# Patient Record
Sex: FEMALE | URBAN - METROPOLITAN AREA
[De-identification: names, ages, dates, MRNs, and addresses within clinical notes are randomized per-mention and may not be internally consistent; named-entity substitution may affect disease eponyms.]

---
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
## 2017-06-06 NOTE — PATIENT INSTRUCTIONS
Labs today.    Schedule mammogram.    Tonja Bravo, ROGER-C  [phone]    Preventive Health Recommendations  Female Ages 40 to 49    Yearly exam:     See your health care provider every year in order to  1. Review health changes.   2. Discuss preventive care.    3. Review your medicines if your doctor prescribed any.      Get a Pap test every three years (unless you have an abnormal result and your provider advises testing more often).      If you get Pap tests with HPV test, you only need to test every 5 years, unless you have an abnormal result. You do not need a Pap test if your uterus was removed (hysterectomy) and you have not had cancer.      You should be tested each year for STDs (sexually transmitted diseases), if you're at risk.       Ask your doctor if you should have a mammogram.      Have a colonoscopy (test for colon cancer) if someone in your family has had colon cancer or polyps before age 50.       Have a cholesterol test every 5 years.       Have a diabetes test (fasting glucose) after age 45. If you are at risk for diabetes, you should have this test every 3 years.    Shots: Get a flu shot each year. Get a tetanus shot every 10 years.     Nutrition:     Eat at least 5 servings of fruits and vegetables each day.    Eat whole-grain bread, whole-wheat pasta and brown rice instead of white grains and rice.    Talk to your provider about Calcium and Vitamin D.     Lifestyle    Exercise at least 150 minutes a week (an average of 30 minutes a day, 5 days a week). This will help you control your weight and prevent disease.    Limit alcohol to one drink per day.    No smoking.     Wear sunscreen to prevent skin cancer.    See your dentist every six months for an exam and cleaning.

## 2017-06-20 ENCOUNTER — OFFICE VISIT (OUTPATIENT)
Dept: FAMILY MEDICINE | Facility: OTHER | Age: 47
End: 2017-06-20
Payer: COMMERCIAL

## 2017-06-20 VITALS
BODY MASS INDEX: 28.95 KG/M2 | WEIGHT: 163.4 LBS | HEART RATE: 63 BPM | RESPIRATION RATE: 16 BRPM | SYSTOLIC BLOOD PRESSURE: 114 MMHG | DIASTOLIC BLOOD PRESSURE: 80 MMHG | HEIGHT: 63 IN | OXYGEN SATURATION: 98 % | TEMPERATURE: 98 F

## 2017-06-20 DIAGNOSIS — E78.2 MIXED HYPERLIPIDEMIA: ICD-10-CM

## 2017-06-20 DIAGNOSIS — R19.7 DIARRHEA, UNSPECIFIED TYPE: ICD-10-CM

## 2017-06-20 DIAGNOSIS — H54.7 WORSENING VISION: ICD-10-CM

## 2017-06-20 DIAGNOSIS — Z83.49 FAMILY HISTORY OF THYROID DISEASE: ICD-10-CM

## 2017-06-20 DIAGNOSIS — Z12.4 SCREENING FOR MALIGNANT NEOPLASM OF CERVIX: Primary | ICD-10-CM

## 2017-06-20 DIAGNOSIS — Z13.1 SCREENING FOR DIABETES MELLITUS: ICD-10-CM

## 2017-06-20 DIAGNOSIS — Z00.00 ENCOUNTER FOR ROUTINE ADULT HEALTH EXAMINATION WITHOUT ABNORMAL FINDINGS: ICD-10-CM

## 2017-06-20 LAB
ANION GAP SERPL CALCULATED.3IONS-SCNC: 9 MMOL/L (ref 3–14)
BUN SERPL-MCNC: 11 MG/DL (ref 7–30)
CALCIUM SERPL-MCNC: 9.1 MG/DL (ref 8.5–10.1)
CHLORIDE SERPL-SCNC: 108 MMOL/L (ref 94–109)
CHOLEST SERPL-MCNC: 283 MG/DL
CO2 SERPL-SCNC: 25 MMOL/L (ref 20–32)
CREAT SERPL-MCNC: 0.62 MG/DL (ref 0.52–1.04)
GFR SERPL CREATININE-BSD FRML MDRD: ABNORMAL ML/MIN/1.7M2
GLUCOSE SERPL-MCNC: 112 MG/DL (ref 70–99)
HDLC SERPL-MCNC: 43 MG/DL
LDLC SERPL CALC-MCNC: 183 MG/DL
NONHDLC SERPL-MCNC: 240 MG/DL
POTASSIUM SERPL-SCNC: 4.2 MMOL/L (ref 3.4–5.3)
SODIUM SERPL-SCNC: 142 MMOL/L (ref 133–144)
TRIGL SERPL-MCNC: 287 MG/DL
TSH SERPL DL<=0.005 MIU/L-ACNC: 0.98 MU/L (ref 0.4–4)

## 2017-06-20 PROCEDURE — 80061 LIPID PANEL: CPT | Performed by: STUDENT IN AN ORGANIZED HEALTH CARE EDUCATION/TRAINING PROGRAM

## 2017-06-20 PROCEDURE — 36415 COLL VENOUS BLD VENIPUNCTURE: CPT | Performed by: STUDENT IN AN ORGANIZED HEALTH CARE EDUCATION/TRAINING PROGRAM

## 2017-06-20 PROCEDURE — 87624 HPV HI-RISK TYP POOLED RSLT: CPT | Performed by: STUDENT IN AN ORGANIZED HEALTH CARE EDUCATION/TRAINING PROGRAM

## 2017-06-20 PROCEDURE — 84443 ASSAY THYROID STIM HORMONE: CPT | Performed by: STUDENT IN AN ORGANIZED HEALTH CARE EDUCATION/TRAINING PROGRAM

## 2017-06-20 PROCEDURE — 99396 PREV VISIT EST AGE 40-64: CPT | Performed by: STUDENT IN AN ORGANIZED HEALTH CARE EDUCATION/TRAINING PROGRAM

## 2017-06-20 PROCEDURE — G0145 SCR C/V CYTO,THINLAYER,RESCR: HCPCS | Performed by: STUDENT IN AN ORGANIZED HEALTH CARE EDUCATION/TRAINING PROGRAM

## 2017-06-20 PROCEDURE — 80048 BASIC METABOLIC PNL TOTAL CA: CPT | Performed by: STUDENT IN AN ORGANIZED HEALTH CARE EDUCATION/TRAINING PROGRAM

## 2017-06-20 ASSESSMENT — PAIN SCALES - GENERAL: PAINLEVEL: NO PAIN (0)

## 2017-06-20 NOTE — MR AVS SNAPSHOT
After Visit Summary   6/20/2017    Carmella Burch    MRN: [id number]           Patient Information     Date Of Birth          1970        Visit Information        Provider Department      6/20/2017 8:00 AM Tonja Bravo APRN [facility]        Today's Diagnoses     Screening for malignant neoplasm of cervix    -  1    Worsening vision        Mixed hyperlipidemia        Screening for diabetes mellitus        Diarrhea, unspecified type        Family history of thyroid disease          Care Instructions    Labs today.    Schedule mammogram.    Tonja Bravo NP-C  [phone]    Preventive Health Recommendations  Female Ages 40 to 49    Yearly exam:     See your health care provider every year in order to  1. Review health changes.   2. Discuss preventive care.    3. Review your medicines if your doctor prescribed any.      Get a Pap test every three years (unless you have an abnormal result and your provider advises testing more often).      If you get Pap tests with HPV test, you only need to test every 5 years, unless you have an abnormal result. You do not need a Pap test if your uterus was removed (hysterectomy) and you have not had cancer.      You should be tested each year for STDs (sexually transmitted diseases), if you're at risk.       Ask your doctor if you should have a mammogram.      Have a colonoscopy (test for colon cancer) if someone in your family has had colon cancer or polyps before age 50.       Have a cholesterol test every 5 years.       Have a diabetes test (fasting glucose) after age 45. If you are at risk for diabetes, you should have this test every 3 years.    Shots: Get a flu shot each year. Get a tetanus shot every 10 years.     Nutrition:     Eat at least 5 servings of fruits and vegetables each day.    Eat whole-grain bread, whole-wheat pasta and brown rice instead of white grains and rice.    Talk to your provider about Calcium  and Vitamin D.     Lifestyle    Exercise at least 150 minutes a week (an average of 30 minutes a day, 5 days a week). This will help you control your weight and prevent disease.    Limit alcohol to one drink per day.    No smoking.     Wear sunscreen to prevent skin cancer.    See your dentist every six months for an exam and cleaning.          Follow-ups after your visit        Additional Services     OPTOMETRY REFERRAL       Your provider has referred you to:  [place]: [facility] [phone]   [email or web address]    Please be aware that coverage of these services is subject to the terms and limitations of your health insurance plan.  Call member services at your health plan with any benefit or coverage questions.      Please bring the following to your appointment:  >>   Any x-rays, CTs or MRIs which have been performed.  Contact the facility where they were done to arrange for  prior to your scheduled appointment.  Any new CT, MRI or other procedures ordered by your specialist must be performed at a [place] facility or coordinated by your clinic's referral office.    >>   List of current medications   >>   This referral request   >>   Any documents/labs given to you for this referral                  Who to contact     If you have questions or need follow up information about today's clinic visit or your schedule please contact [facility] directly at [phone].  Normal or non-critical lab and imaging results will be communicated to you by MyChart, letter or phone within 4 business days after the clinic has received the results. If you do not hear from us within 7 days, please contact the clinic through MyChart or phone. If you have a critical or abnormal lab result, we will notify you by phone as soon as possible.  Submit refill requests through ev-social or call your pharmacy and they will forward the refill request to us. Please allow 3  "business days for your refill to be completed.          Additional Information About Your Visit        MyChart Information     triptap gives you secure access to your electronic health record. If you see a primary care provider, you can also send messages to your care team and make appointments. If you have questions, please call your primary care clinic.  If you do not have a primary care provider, please call [phone] and they will assist you.        Care EveryWhere ID     This is your Care EveryWhere ID. This could be used by other organizations to access your [facility] medical records  [id number]        Your Vitals Were     Pulse Temperature Respirations Height Last Period Pulse Oximetry    63 98  F (36.7  C) (Oral) 16 5' 3.39\" (1.61 m) 06/14/2017 (Exact Date) 98%    BMI (Body Mass Index)                   28.59 kg/m2            Blood Pressure from Last 3 Encounters:   06/20/17 114/80   01/13/14 133/68   02/20/13 100/72    Weight from Last 3 Encounters:   06/20/17 163 lb 6.4 oz (74.1 kg)   01/13/14 155 lb (70.3 kg)   02/20/13 152 lb 12.8 oz (69.3 kg)              We Performed the Following     Basic metabolic panel     HPV High Risk Types DNA Cervical     Lipid panel reflex to direct LDL     OPTOMETRY REFERRAL     Pap imaged thin layer screen with HPV - recommended age 30 - 65 years (select HPV order below)     TSH with free T4 reflex        Primary Care Provider Office Phone # Fax #    Tonja GISSELL [facility] [phone]       [facility] [address]  [facility] [address]        Thank you!     Thank you for choosing [facility]  for your care. Our goal is always to provide you with excellent care. Hearing back from our patients is one way we can continue to improve our services. Please take a few minutes to complete the written survey that you may receive in the mail after your visit with us. Thank you!             Your Updated Medication " List - Protect others around you: Learn how to safely use, store and throw away your medicines at www.disposemymeds.org.      Notice  As of 6/20/2017  8:46 AM    You have not been prescribed any medications.

## 2017-06-20 NOTE — NURSING NOTE
"Chief Complaint   Patient presents with     Physical     Panel Management     Pap, Eye exam       Initial /80 (BP Location: Left arm, Patient Position: Chair, Cuff Size: Adult Regular)  Pulse 63  Temp 98  F (36.7  C) (Oral)  Resp 16  Ht 5' 3.39\" (1.61 m)  Wt 163 lb 6.4 oz (74.1 kg)  LMP 06/14/2017 (Exact Date)  SpO2 98%  BMI 28.59 kg/m2 Estimated body mass index is 28.59 kg/(m^2) as calculated from the following:    Height as of this encounter: 5' 3.39\" (1.61 m).    Weight as of this encounter: 163 lb 6.4 oz (74.1 kg).  Medication Reconciliation: complete   Lynda Narvaez CMA      "

## 2017-06-21 RX ORDER — SIMVASTATIN 10 MG
10 TABLET ORAL AT BEDTIME
Qty: 90 TABLET | Refills: 1 | Status: SHIPPED | OUTPATIENT
Start: 2017-06-21 | End: 2018-01-24

## 2017-06-22 LAB
COPATH REPORT: NORMAL
PAP: NORMAL

## 2017-06-23 LAB
FINAL DIAGNOSIS: NORMAL
HPV HR 12 DNA CVX QL NAA+PROBE: NEGATIVE
HPV16 DNA SPEC QL NAA+PROBE: NEGATIVE
HPV18 DNA SPEC QL NAA+PROBE: NEGATIVE
SPECIMEN DESCRIPTION: NORMAL

## 2017-07-11 ENCOUNTER — OFFICE VISIT (OUTPATIENT)
Dept: OPTOMETRY | Facility: CLINIC | Age: 47
End: 2017-07-11
Payer: COMMERCIAL

## 2017-07-11 DIAGNOSIS — H52.223 REGULAR ASTIGMATISM OF BOTH EYES: ICD-10-CM

## 2017-07-11 DIAGNOSIS — Z86.69: ICD-10-CM

## 2017-07-11 DIAGNOSIS — H52.4 PRESBYOPIA: Primary | ICD-10-CM

## 2017-07-11 PROCEDURE — 92015 DETERMINE REFRACTIVE STATE: CPT | Performed by: OPTOMETRIST

## 2017-07-11 PROCEDURE — 92004 COMPRE OPH EXAM NEW PT 1/>: CPT | Performed by: OPTOMETRIST

## 2017-07-11 ASSESSMENT — KERATOMETRY
OS_K2POWER_DIOPTERS: 46.00
OS_K1POWER_DIOPTERS: 45.00
OS_AXISANGLE2_DEGREES: 167
OD_K2POWER_DIOPTERS: 46.25
OD_K1POWER_DIOPTERS: 45.00
OD_AXISANGLE2_DEGREES: 3

## 2017-07-11 ASSESSMENT — CUP TO DISC RATIO
OD_RATIO: 0.4
OS_RATIO: 0.3

## 2017-07-11 ASSESSMENT — REFRACTION_MANIFEST
OD_SPHERE: 0.00
OD_ADD: +2.00
OS_AXIS: 68
OS_SPHERE: -0.25
METHOD_AUTOREFRACTION: 1
OS_SPHERE: 0.00
OD_CYLINDER: +0.50
OS_AXIS: 085
OD_AXIS: 106
OS_CYLINDER: +0.50
OD_CYLINDER: +0.50
OD_AXIS: 010
OS_CYLINDER: +0.50
OD_SPHERE: PLANO
OS_ADD: +2.00

## 2017-07-11 ASSESSMENT — REFRACTION_WEARINGRX
OD_SPHERE: +0.25
OD_ADD: +1.25
OS_SPHERE: +2.00
OS_SPHERE: +0.25
OS_CYLINDER: SPHERE
OS_ADD: +1.25
OD_CYLINDER: SPHERE
OD_SPHERE: +2.00
SPECS_TYPE: OTC

## 2017-07-11 ASSESSMENT — EXTERNAL EXAM - LEFT EYE: OS_EXAM: NORMAL

## 2017-07-11 ASSESSMENT — VISUAL ACUITY
CORRECTION_TYPE: GLASSES
OS_CC: 20/20
METHOD: SNELLEN - LINEAR
OS_SC: 20/20
OD_CC: 20/20-1
OD_SC: 20/20

## 2017-07-11 ASSESSMENT — CONF VISUAL FIELD
OS_NORMAL: 1
OD_NORMAL: 1

## 2017-07-11 ASSESSMENT — SLIT LAMP EXAM - LIDS
COMMENTS: NORMAL
COMMENTS: NORMAL

## 2017-07-11 ASSESSMENT — TONOMETRY
OD_IOP_MMHG: 16
IOP_METHOD: APPLANATION
OS_IOP_MMHG: 16

## 2017-07-11 ASSESSMENT — EXTERNAL EXAM - RIGHT EYE: OD_EXAM: NORMAL

## 2017-07-11 NOTE — PROGRESS NOTES
Chief Complaint   Patient presents with     COMPREHENSIVE EYE EXAM     Here in 2013         Last Eye Exam: 3/6/2013  Dilated Previously: Yes    What are you currently using to see?  Readers, uses them for reading, computer and [place]        Distance Vision Acuity: Satisfied with vision, no changes.     Near Vision Acuity: Some changes to near vision, has to use the readers more often    Eye Comfort: good, had really red right eye 3 weeks ago, no pain or irritation, denies  bruising easily    Do you use eye drops? 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       OBJECTIVE: See Ophthalmology exam    ASSESSMENT:    ICD-10-CM    1. Presbyopia H52.4 EYE EXAM (SIMPLE-NONBILLABLE)     REFRACTIVE STATUS   2. Hx of subconjunctival hemorrhage right Z86.69 EYE EXAM (SIMPLE-NONBILLABLE)     REFRACTIVE STATUS   3. Regular astigmatism of both eyes H52.223 EYE EXAM (SIMPLE-NONBILLABLE)     REFRACTIVE STATUS      PLAN:     Patient Instructions   Patient was advised of today's exam findings.  Reading glasses advised  If conjunctival hemorrhages reoccur more than 4 x a year see your primary care provider  Use Blink Tears three times a day as needed   Return in 1 year for eye exam    Belén Acosta O.D.  [facility]   [address] [place], [place] [address] [phone]

## 2017-07-11 NOTE — PATIENT INSTRUCTIONS
Patient was advised of today's exam findings.  Reading glasses advised  If conjunctival hemorrhages reoccur more than 4 x a year see your primary care provider  Use Blink Tears three times a day as needed   Return in 1 year for eye exam    Belén Acosta O.D.  [facility]   [address] [place] [address] [phone]

## 2017-07-11 NOTE — MR AVS SNAPSHOT
After Visit Summary   7/11/2017    Carmella Burch    MRN: [id number]           Patient Information     Date Of Birth          1970        Visit Information        Provider Department      7/11/2017 11:00 AM Belén Acosta OD [facility]        Today's Diagnoses     Presbyopia    -  1      Care Instructions    Patient was advised of today's exam findings.  Reading glasses advised  If conjunctival hemorrhages reoccur more than 4 x a year see your primary care provider  Use Blink Tears three times a day as needed   Return in 1 year for eye exam    Belén Acosta O.D.  [facility]   [address] [place] [address]  [phone]            Follow-ups after your visit        Who to contact     If you have questions or need follow up information about today's clinic visit or your schedule please contact [facility] directly at [phone].  Normal or non-critical lab and imaging results will be communicated to you by MyChart, letter or phone within 4 business days after the clinic has received the results. If you do not hear from us within 7 days, please contact the clinic through Jumiohart or phone. If you have a critical or abnormal lab result, we will notify you by phone as soon as possible.  Submit refill requests through [employer] or call your pharmacy and they will forward the refill request to us. Please allow 3 business days for your refill to be completed.          Additional Information About Your Visit        MyChart Information     [employer] gives you secure access to your electronic health record. If you see a primary care provider, you can also send messages to your care team and make appointments. If you have questions, please call your primary care clinic.  If you do not have a primary care provider, please call [phone] and they will assist you.        Care EveryWhere ID     This is your Care EveryWhere ID. This could be used by other  organizations to access your [facility] medical records  [id number]        Your Vitals Were     Last Period                   06/14/2017 (Exact Date)            Blood Pressure from Last 3 Encounters:   06/20/17 114/80   01/13/14 133/68   02/20/13 100/72    Weight from Last 3 Encounters:   06/20/17 74.1 kg (163 lb 6.4 oz)   01/13/14 70.3 kg (155 lb)   02/20/13 69.3 kg (152 lb 12.8 oz)              Today, you had the following     No orders found for display       Primary Care Provider Office Phone # Fax #    Tonja Kochean, APRN [facility] [phone]       [facility] [address]  [place] [address]        Equal Access to Services     TAPAN GEORGE : Hadii aad ku hadasho Soomaali, waaxda luqadaha, qaybta kaalmada adeegyada, viridiana king . So [facility] [phone].    ATENCIÓN: Si habla español, tiene a serrano disposición servicios gratuitos de asistencia lingüística. Llame al [phone].    We comply with applicable federal civil rights laws and [place] laws. We do not discriminate on the basis of race, color, national origin, age, disability sex, sexual orientation or gender identity.            Thank you!     Thank you for choosing [facility]  for your [facility]. Our goal is always to provide you with excellent care. Hearing back from our patients is one way we can continue to improve our services. Please take a few minutes to complete the written survey that you may receive in the mail after your visit with us. Thank you!             Your Updated Medication List - Protect others around you: Learn how to safely use, store and throw away your medicines at www.disposemymeds.org.          This list is accurate as of: 7/11/17 12:02 PM.  Always use your most recent med list.                   Brand Name Dispense Instructions for use Diagnosis    simvastatin 10 MG tablet    ZOCOR    90 tablet    Take 1 tablet (10 mg) by mouth At Bedtime    Mixed  hyperlipidemia

## 2017-12-22 ENCOUNTER — TELEPHONE (OUTPATIENT)
Dept: FAMILY MEDICINE | Facility: OTHER | Age: 47
End: 2017-12-22

## 2017-12-22 NOTE — TELEPHONE ENCOUNTER
Panel Management Review      Patient has the following on her problem list: None      Composite cancer screening  Chart review shows that this patient is due/due soon for the following None  Summary:    Patient is due/failing the following:   lipids    Action needed:   Patient needs fasting lab only appointment, order placed    Type of outreach:    Phone, left message for patient to call back.     Questions for provider review:    None                                                                                                                                    Roxy Mattson CMA ([facility])       Chart routed to Care Team .

## 2017-12-22 NOTE — LETTER
[facility]  [id number] [place] [id number]  Phone: [phone]  January 9, 2018      Carmella Burch  [address] [place] [address]      Dear Carmella,    We care about your health and have reviewed your health plan including your medical conditions, medications, and lab results.  Based on this review, it is recommended that you follow up regarding the following health topic(s):  -Cholesterol    We recommend you take the following action(s):  -schedule a LAB ONLY APPOINTMENT to recheck your: Lipids (fasting cholesterol - nothing to eat except water and/or meds for 8-10 hours) within the next 1-4 weeks.  If' you have had labs completed eslewhere, please let us know so we can update your records.       Please call us at the [facility] - [phone] (or use [facility]) to address the above recommendations.     Thank you for trusting [facility] and we appreciate the opportunity to serve you.  We look forward to supporting your healthcare needs in the future.    Healthy Regards,    Your Health Care Team  [facility]

## 2018-01-05 NOTE — TELEPHONE ENCOUNTER
Summary:     Patient is due/failing the following:   lipids     Action needed:   Patient needs fasting lab only appointment, order placed     Type of outreach:    Phone, left message for patient to call back.     Roxy Mattson CMA ([facility])

## 2018-01-23 ENCOUNTER — TELEPHONE (OUTPATIENT)
Dept: FAMILY MEDICINE | Facility: OTHER | Age: 48
End: 2018-01-23

## 2018-01-23 NOTE — TELEPHONE ENCOUNTER
Carmella Burch is a 47 year old female who calls with hypertension.    NURSING ASSESSMENT:  Description:  Pt would like to be seen due to high blood pressure.    Onset/duration:  today  Precip. factors:  none  Associated symptoms:  High blood pressure (150/99).  Denies chest pain, headache, blurred vision.  Improves/worsens symptoms:  Had headache earlier this morning but it is now gone.  Pain scale (0-10)   0/10    Allergies: No Known Allergies    RECOMMENDED DISPOSITION:  See in 24 hours - due to pt would like to be seen.  Will comply with recommendation: Yes  If further questions/concerns or if symptoms do not improve, worsen or new symptoms develop, call your PCP or [facility] as soon as possible.      Guideline used: hypertension  Telephone Triage Protocols for Nurses, Fifth Edition, Emma Salamanca RN

## 2018-01-23 NOTE — PROGRESS NOTES
SUBJECTIVE:                                                    Carmella Burch is a 47 year old female who presents to clinic today for the following health issues:    History of Present Illness     Hypertension:     Outpatient blood pressures:  Are not being checked    Dietary sodium intake::  Not monitoring salt intake    Diet:  Regular (no restrictions)  Frequency of exercise:  2-3 days/week  Duration of exercise:  15-30 minutes  Taking medications regularly:  Yes  Medication side effects:  None  Additional concerns today:  YES    Answers for HPI/ROS submitted by the patient on 1/24/2018   PHQ-2 Score: 0    Patient denies history of hypertension. She states she experienced a fairly intense headache in the center of her 1 week ago which improved after an hour. She then had another headache on Sunday with bright lights in her peripheral vision that lasted a few hours with similar symptoms yesterday. She felt faint several times during these episodes but never passed out and also felt nauseous. She denies experience any sound or light sensitivity. She woke up with a headache over her right forehead today but denies any current visual symptoms. She has taken Tylenol without benefit. She has never had headaches like this in the past. She will occasionally have headaches during the first day of her period but this does not happen often. She denies visual loss, facial droop, extremity weakness, or balance difficulty. 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 She denies any chest pain, lightheadedness or shortness of breath.     She has been having severe hot flashes daily for the past few months and has not had her period since November. He periods have been less frequent over the past year. She describes some vaginal dryness which has been improved with over the counter treatments.     NURSING  "ASSESSMENT:  Description:  Pt would like to be seen due to high blood pressure.    Onset/duration:  today  Precip. factors:  none  Associated symptoms:  High blood pressure (150/99).  Denies chest pain, headache, blurred vision.  Improves/worsens symptoms:  Had headache earlier this morning but it is now gone.  Pain scale (0-10)   0/10    Problem list and histories reviewed & adjusted, as indicated.  Additional history: none    ROS:  GENERAL: +Hot flashes. Denies fever, fatigue, weakness, weight gain, or weight loss.  CARDIOVASCULAR: Denies chest pain, shortness of breath, irregular heartbeats, palpitations, or edema.  RESPIRATORY: Denies cough, hemoptysis, and shortness of breath.  NEUROLOGIC: +Frontal headaches. Denies fainting, dizziness, memory loss, numbness, tingling, or seizures.     OBJECTIVE:     /86  Pulse 54  Temp 97.7  F (36.5  C) (Temporal)  Resp 16  Ht 5' 3\" (1.6 m)  Wt 156 lb 6.4 oz (70.9 kg)  SpO2 99%  BMI 27.71 kg/m2  Body mass index is 27.71 kg/(m^2).  GENERAL: healthy, alert and no distress  EYES: Eyes grossly normal to inspection, PERRL and conjunctivae and sclerae normal  RESP: lungs clear to auscultation - no rales, rhonchi or wheezes  CV: regular rate and rhythm, normal S1 S2, no S3 or S4, no murmur, click or rub, no peripheral edema  NEURO: Normal strength and tone, mentation intact and speech normal. Cranial nerves II-XII are grossly intact. DTRs are 2+/4 throughout and symmetric. Gait is stable.   PSYCH: mentation appears normal, affect normal/bright    ASSESSMENT/PLAN:       ICD-10-CM    1. New onset of headaches R51 MR Brain w/o Contrast   2. Elevated blood pressure reading without diagnosis of hypertension R03.0    3. Menopausal syndrome (hot flashes) N95.1 Follicle stimulating hormone     Lutropin   4. Elevated fasting glucose R73.01 Glucose       1. Recent headaches sound migrainous in origin but she denies history of any headaches like this in the past and rarely has any " type of headache. I recommend a brain MRI for further evaluation to rule out any worrisome causes. If headaches continue, I recommend resting in a dark room with a cool cloth over her forehead. She can continue with NSAIDs and Tylenol as needed and we can discuss migraine medications if headache persist and MRI is normal.     2. Recently elevated blood pressure likely related to her headaches. Initial BP slightly elevated today but repeat was normal. I recommend she monitor BP at home daily and if consistently above 140/90, she will let me know. She does have a family history of high blood pressure. She will continue with a low salt diet.    3. Worsening hot flashes recently. I discussed possible prescription medication options but she refuses as this time. I discussed over the counter vitamins and supplements and have provided her with a handout. If symptoms are not improving, she will contact the clinic.    4. She will complete updated fasting labs at her convenience. She has been off the statin for a couple of months as she has drastically changed her diet and wanted to see if this would help her cholesterol.     Bebeto King PA-C  [facility]

## 2018-01-24 ENCOUNTER — OFFICE VISIT (OUTPATIENT)
Dept: FAMILY MEDICINE | Facility: OTHER | Age: 48
End: 2018-01-24
Payer: COMMERCIAL

## 2018-01-24 VITALS
OXYGEN SATURATION: 99 % | WEIGHT: 156.4 LBS | SYSTOLIC BLOOD PRESSURE: 132 MMHG | HEART RATE: 54 BPM | DIASTOLIC BLOOD PRESSURE: 86 MMHG | TEMPERATURE: 97.7 F | BODY MASS INDEX: 27.71 KG/M2 | RESPIRATION RATE: 16 BRPM | HEIGHT: 63 IN

## 2018-01-24 DIAGNOSIS — R51.9 NEW ONSET OF HEADACHES: Primary | ICD-10-CM

## 2018-01-24 DIAGNOSIS — R03.0 ELEVATED BLOOD PRESSURE READING WITHOUT DIAGNOSIS OF HYPERTENSION: ICD-10-CM

## 2018-01-24 DIAGNOSIS — R73.01 ELEVATED FASTING GLUCOSE: ICD-10-CM

## 2018-01-24 DIAGNOSIS — N95.1 MENOPAUSAL SYNDROME (HOT FLASHES): ICD-10-CM

## 2018-01-24 LAB
FSH SERPL-ACNC: 48.9 IU/L
LH SERPL-ACNC: 26.3 IU/L

## 2018-01-24 PROCEDURE — 36415 COLL VENOUS BLD VENIPUNCTURE: CPT | Performed by: PHYSICIAN ASSISTANT

## 2018-01-24 PROCEDURE — 99214 OFFICE O/P EST MOD 30 MIN: CPT | Performed by: PHYSICIAN ASSISTANT

## 2018-01-24 PROCEDURE — 83001 ASSAY OF GONADOTROPIN (FSH): CPT | Performed by: PHYSICIAN ASSISTANT

## 2018-01-24 PROCEDURE — 83002 ASSAY OF GONADOTROPIN (LH): CPT | Performed by: PHYSICIAN ASSISTANT

## 2018-01-24 NOTE — PATIENT INSTRUCTIONS
Will order a brain MRI to make sure there are no abnormalities that may be causing your headaches.  The headaches sounds like migraine headaches as this can cause an aura/flashing lights.  When a headache occurs, I recommend you rest in a dark room and take ibuprofen, Tylenol or Excedrin as needed.  If the headaches worsen, please let me know as there are other medications that can be used.    Monitor blood pressure closely at work and if consistently above 140/90 over the next month, let me know.  Continue with a low salt diet.      Coping with Symptoms of Menopause  What symptoms of menopause may occur with my treatment?  Chemotherapy drugs or medicines that block hormones may bring on menopause.  These changes may include:    Hot flashes    Vaginal dryness    Trouble falling asleep (insomnia)    Headache    Depression.  How are hot flashes treated?  Your doctor may suggest medicine to control the hot flashes. Vitamins E, B6 or C may also help. Talk to your doctor about how much to take.  Some herbs or foods may help: primrose oil, garlic, chamomile, ginseng root, royal jelly or soy.  What else can I do to cope with hot flashes?    Wear clothes made of natural fabric such as cotton.    Dress in layers. You can remove them when you feel too warm.    Avoid hot drinks (coffee or tea) and spicy foods.    Keep the room temperature low if you can.    Control your stress. Exercise and relaxation techniques may help.    Keep track of when and how often hot flashes occur. Note what is happening right before a hot flash. This may give you some control over future hot flashes.  How is vaginal dryness treated?    You can try drugstore products such as Replens, Gyne-Moistrin or Lubrin. They last for about three days.    Use water-based lubricants during sex. These include Astroglide and K-Y Jelly. Do not use Vaseline or petroleum jelly because they are not good for vaginal tissues.    Use low-dose estrogen cream in the vagina.  Your doctor must prescribe this medicine.  How can I cope if I have trouble sleeping?    Get in bed when you are ready to go to sleep. Do not watch TV or read in bed.    Follow a sleeping routine: Go to bed and get up at the same times. Get up on time even if you did not sleep well.    If you do not fall asleep within 30 minutes, get up.    Get regular exercise. Do not exercise right before bedtime.    Avoid alcohol. It may disrupt your sleep.    Try natural remedies just before bedtime such as warm milk, chamomile tea or verbena tea.  How can I treat headache?  Ask your doctor if it is safe to take aspirin, Tylenol (acetaminophen) or Advil (ibuprofen). They may cause side effects when mixed with chemotherapy.  How can I cope with depression?  Mild depression    Start an exercise program. Exercise with a friend. Any activity is better than none. Walk to the mailbox, to see your neighbors or in the mall.    Share your feelings with family and friends.    Don't give in to negative feelings.  Severe depression  If your depression lasts longer than two weeks, talk to your care team. They may suggest counseling or medicine.  Comments:  black cohosh is another herbal supplement that can be used________________________________________  __________________________________________    For informational purposes only. Not to replace the advice of your health care provider.  Copyright   2010 [facility]. All rights reserved. [employer] 665464 - 12/15.

## 2018-01-24 NOTE — NURSING NOTE
"Chief Complaint   Patient presents with     Hypertension     Panel Management     flu       Initial /90 (BP Location: Left arm, Patient Position: Chair, Cuff Size: Adult Regular)  Pulse 54  Temp 97.7  F (36.5  C) (Temporal)  Resp 16  Ht 5' 3\" (1.6 m)  Wt 156 lb 6.4 oz (70.9 kg)  SpO2 99%  BMI 27.71 kg/m2 Estimated body mass index is 27.71 kg/(m^2) as calculated from the following:    Height as of this encounter: 5' 3\" (1.6 m).    Weight as of this encounter: 156 lb 6.4 oz (70.9 kg).  Medication Reconciliation: complete   Abbey Bustos CMA      "

## 2018-01-24 NOTE — MR AVS SNAPSHOT
After Visit Summary   1/24/2018    Carmella Burch    MRN: [id number]           Patient Information     Date Of Birth          1970        Visit Information        Provider Department      1/24/2018 8:00 AM Bebeto King PA-C [facility]        Today's Diagnoses     New onset of headaches    -  1    Elevated blood pressure reading without diagnosis of hypertension        Menopausal syndrome (hot flashes)        Elevated fasting glucose          Care Instructions    Will order a brain MRI to make sure there are no abnormalities that may be causing your headaches.  The headaches sounds like migraine headaches as this can cause an aura/flashing lights.  When a headache occurs, I recommend you rest in a dark room and take ibuprofen, Tylenol or Excedrin as needed.  If the headaches worsen, please let me know as there are other medications that can be used.    Monitor blood pressure closely at work and if consistently above 140/90 over the next month, let me know.  Continue with a low salt diet.      Coping with Symptoms of Menopause  What symptoms of menopause may occur with my treatment?  Chemotherapy drugs or medicines that block hormones may bring on menopause.  These changes may include:    Hot flashes    Vaginal dryness    Trouble falling asleep (insomnia)    Headache    Depression.  How are hot flashes treated?  Your doctor may suggest medicine to control the hot flashes. Vitamins E, B6 or C may also help. Talk to your doctor about how much to take.  Some herbs or foods may help: primrose oil, garlic, chamomile, ginseng root, royal jelly or soy.  What else can I do to cope with hot flashes?    Wear clothes made of natural fabric such as cotton.    Dress in layers. You can remove them when you feel too warm.    Avoid hot drinks (coffee or tea) and spicy foods.    Keep the room temperature low if you can.    Control your stress. Exercise and relaxation techniques may  help.    Keep track of when and how often hot flashes occur. Note what is happening right before a hot flash. This may give you some control over future hot flashes.  How is vaginal dryness treated?    You can try drugstore products such as Replens, Gyne-Moistrin or Lubrin. They last for about three days.    Use water-based lubricants during sex. These include Astroglide and K-Y Jelly. Do not use Vaseline or petroleum jelly because they are not good for vaginal tissues.    Use low-dose estrogen cream in the vagina. Your doctor must prescribe this medicine.  How can I cope if I have trouble sleeping?    Get in bed when you are ready to go to sleep. Do not watch TV or read in bed.    Follow a sleeping routine: Go to bed and get up at the same times. Get up on time even if you did not sleep well.    If you do not fall asleep within 30 minutes, get up.    Get regular exercise. Do not exercise right before bedtime.    Avoid alcohol. It may disrupt your sleep.    Try natural remedies just before bedtime such as warm milk, chamomile tea or verbena tea.  How can I treat headache?  Ask your doctor if it is safe to take aspirin, Tylenol (acetaminophen) or Advil (ibuprofen). They may cause side effects when mixed with chemotherapy.  How can I cope with depression?  Mild depression    Start an exercise program. Exercise with a friend. Any activity is better than none. Walk to the mailbox, to see your neighbors or in the mall.    Share your feelings with family and friends.    Don't give in to negative feelings.  Severe depression  If your depression lasts longer than two weeks, talk to your care team. They may suggest counseling or medicine.  Comments:  black cohosh is another herbal supplement that can be used________________________________________  __________________________________________    For informational purposes only. Not to replace the advice of your health care provider.  Copyright   2010 [place] [employer].  All rights reserved. [employer] [id number] - 12/15.            Follow-ups after your visit        Follow-up notes from your care team     Return if symptoms worsen or fail to improve.      Your next 10 appointments already scheduled     Jan 30, 2018  9:30 AM CST   (Arrive by 9:15 AM)   MR BRAIN W/O CONTRAST with PHMR1   [facility] ([facility])    [address] [place] [id number] [phone]           Take your medicines as usual, unless your doctor tells you not to. Bring a list of your current medicines to your exam (including vitamins, minerals and over-the-counter drugs). Also bring the results of similar scans you may have had.  Please remove any body piercings and hair extensions before you arrive.  Follow your doctor s orders. If you do not, we may have to postpone your exam.  You will not have contrast for this exam. You do not need to do anything special to prepare.  The MRI machine uses a strong magnet. Please wear clothes without metal (snaps, zippers). A sweatsuit works well, or we may give you a hospital gown.   **IMPORTANT** THE INSTRUCTIONS BELOW ARE ONLY FOR THOSE PATIENTS WHO HAVE BEEN TOLD THEY WILL RECEIVE SEDATION OR GENERAL ANESTHESIA DURING THEIR MRI PROCEDURE:  IF YOU WILL RECEIVE SEDATION (take medicine to help you relax during your exam):   You must get the medicine from your doctor before you arrive. Bring the medicine to the exam. Do not take it at home.   Arrive one hour early. Bring someone who can take you home after the test. Your medicine will make you sleepy. After the exam, you may not drive, take a bus or take a taxi by yourself.   No eating 8 hours before your exam. You may have clear liquids up until 4 hours before your exam. (Clear liquids include water, clear tea, black coffee and fruit juice without pulp.)  IF YOU WILL RECEIVE ANESTHESIA (be asleep for your exam):   Arrive 1 1/2 hours early. Bring someone who can take you home after the  test. You may not drive, take a bus or take a taxi by yourself.   No eating 8 hours before your exam. You may have clear liquids up until 4 hours before your exam. (Clear liquids include water, clear tea, black coffee and fruit juice without pulp.)   You will spend four to five hours in the recovery room.  Please call the Imaging Department at your exam site with any questions.              Future tests that were ordered for you today     Open Future Orders        Priority Expected Expires Ordered    Glucose Routine 1/26/2018 2/26/2018 1/24/2018    MR Brain w/o Contrast Routine  1/24/2019 1/24/2018            Who to contact     If you have questions or need follow up information about today's clinic visit or your schedule please contact [facility] directly at [phone].  Normal or non-critical lab and imaging results will be communicated to you by [employer]hart, letter or phone within 4 business days after the clinic has received the results. If you do not hear from us within 7 days, please contact the clinic through [employer]hart or phone. If you have a critical or abnormal lab result, we will notify you by phone as soon as possible.  Submit refill requests through [email or web address] or call your pharmacy and they will forward the refill request to us. Please allow 3 business days for your refill to be completed.          Additional Information About Your Visit        [email or web address] Information     [email or web address] gives you secure access to your electronic health record. If you see a primary care provider, you can also send messages to your care team and make appointments. If you have questions, please call your primary care clinic.  If you do not have a primary care provider, please call [phone] and they will assist you.        Care EveryWhere ID     This is your Care EveryWhere ID. This could be used by other organizations to access your [facility] medical records  [id number]        Your Vitals Were     Pulse Temperature  "Respirations Height Pulse Oximetry BMI (Body Mass Index)    54 97.7  F (36.5  C) (Temporal) 16 5' 3\" (1.6 m) 99% 27.71 kg/m2       Blood Pressure from Last 3 Encounters:   01/24/18 132/90   06/20/17 114/80   01/13/14 133/68    Weight from Last 3 Encounters:   01/24/18 156 lb 6.4 oz (70.9 kg)   06/20/17 163 lb 6.4 oz (74.1 kg)   01/13/14 155 lb (70.3 kg)              We Performed the Following     Follicle stimulating hormone     Lutropin        Primary Care Provider Office Phone # Fax #    Tonja Abbottn, APRN [facility] [phone] [address] [address] [place] [address]        Equal Access to Services     [facility] : Hadii aad ku hadasho Soomaali, waaxda luqadaha, qaybta kaalmada adeegyada, viridiana mcdanielin haymarge king . So [facility] [phone].    ATENCIÓN: Si habla español, tiene a serrano disposición servicios gratuitos de asistencia lingüística. Abbie al [phone].    We comply with applicable federal civil rights laws and [place] laws. We do not discriminate on the basis of race, color, national origin, age, disability, sex, sexual orientation, or gender identity.            Thank you!     Thank you for choosing [facility]  for your care. Our goal is always to provide you with excellent care. Hearing back from our patients is one way we can continue to improve our services. Please take a few minutes to complete the written survey that you may receive in the mail after your visit with us. Thank you!             Your Updated Medication List - Protect others around you: Learn how to safely use, store and throw away your medicines at www.disposemymeds.org.          This list is accurate as of 1/24/18  8:47 AM.  Always use your most recent med list.                   Brand Name Dispense Instructions for use Diagnosis    MENOPAUSE FORMULA PO           simvastatin 10 MG tablet    ZOCOR    90 tablet    Take 1 tablet (10 mg) by mouth At Bedtime    Mixed hyperlipidemia "

## 2018-01-30 ENCOUNTER — HOSPITAL ENCOUNTER (OUTPATIENT)
Dept: MRI IMAGING | Facility: CLINIC | Age: 48
Discharge: HOME | End: 2018-01-30
Attending: PHYSICIAN ASSISTANT | Admitting: PHYSICIAN ASSISTANT
Payer: COMMERCIAL

## 2018-01-30 DIAGNOSIS — R51.9 NEW ONSET OF HEADACHES: ICD-10-CM

## 2018-01-30 PROCEDURE — 70551 MRI BRAIN STEM W/O DYE: CPT

## 2018-03-10 ENCOUNTER — MYC MEDICAL ADVICE (OUTPATIENT)
Dept: FAMILY MEDICINE | Facility: OTHER | Age: 48
End: 2018-03-10

## 2018-03-10 DIAGNOSIS — N95.1 MENOPAUSAL SYNDROME (HOT FLASHES): Primary | ICD-10-CM

## 2018-03-12 RX ORDER — CITALOPRAM HYDROBROMIDE 10 MG/1
TABLET ORAL
Qty: 60 TABLET | Refills: 1 | Status: SHIPPED | OUTPATIENT
Start: 2018-03-12 | End: 2018-04-25

## 2018-03-12 NOTE — TELEPHONE ENCOUNTER
PER LOV with DEEPA 3. Worsening hot flashes recently. I discussed possible prescription medication options but she refuses as this time. I discussed over the counter vitamins and supplements and have provided her with a handout. If symptoms are not improving, she will contact the clinic.  Will route to DEEPA to review.  Marilee Juarez, ESME

## 2018-06-05 ENCOUNTER — MYC REFILL (OUTPATIENT)
Dept: FAMILY MEDICINE | Facility: OTHER | Age: 48
End: 2018-06-05

## 2018-06-05 DIAGNOSIS — N95.1 MENOPAUSAL SYNDROME (HOT FLASHES): ICD-10-CM

## 2018-06-05 NOTE — TELEPHONE ENCOUNTER
Message from MyChart:  Original authorizing provider: MD Carmella Celestin Marcin would like a refill of the following medications:  citalopram (CELEXA) 20 MG tablet [Karon Agee MD]    Preferred pharmacy: [facility] [place] - [address] [place] [address]    Comment:

## 2018-06-06 RX ORDER — CITALOPRAM HYDROBROMIDE 20 MG/1
20 TABLET ORAL DAILY
Qty: 30 TABLET | Refills: 1 | Status: SHIPPED | OUTPATIENT
Start: 2018-06-06 | End: 2018-07-03

## 2018-06-06 NOTE — TELEPHONE ENCOUNTER
Celexa    Prescription approved per [facility] Refill Protocol.    Elizabeth Monet, RN, BSN

## 2018-07-03 ENCOUNTER — MYC REFILL (OUTPATIENT)
Dept: FAMILY MEDICINE | Facility: OTHER | Age: 48
End: 2018-07-03

## 2018-07-03 DIAGNOSIS — N95.1 MENOPAUSAL SYNDROME (HOT FLASHES): ICD-10-CM

## 2018-07-03 RX ORDER — CITALOPRAM HYDROBROMIDE 20 MG/1
20 TABLET ORAL DAILY
Qty: 30 TABLET | Refills: 1 | Status: SHIPPED | OUTPATIENT
Start: 2018-07-03

## 2018-07-03 NOTE — TELEPHONE ENCOUNTER
Citalopram    Prescription approved per [facility] Refill Protocol.    Elizabeth Monet, RN, BSN

## 2018-07-03 NOTE — TELEPHONE ENCOUNTER
Message from MyChart:  Original authorizing provider: MD Carmella Celestin Marcin would like a refill of the following medications:  citalopram (CELEXA) 20 MG tablet [Karon Agee MD]    Preferred pharmacy: [facility] - [place], [place] - [address] [place] [address]    Comment:

## 2018-07-18 ENCOUNTER — TELEPHONE (OUTPATIENT)
Dept: FAMILY MEDICINE | Facility: OTHER | Age: 48
End: 2018-07-18

## 2018-07-18 NOTE — TELEPHONE ENCOUNTER
Panel Management Review      Patient has the following on her problem list: None      Composite cancer screening  Chart review shows that this patient is due/due soon for the following None  Summary:    Patient is due/failing the following:   Eye exam, HIV Screen and LDL    Action needed:   Patient needs office visit for eye exam. and Patient needs fasting lab only appointment    Type of outreach:    Phone, left message for patient to call back. Orders extended.    Questions for provider review:    None                                                                                                                                    Bridget Garcia MA       Chart routed to Care Team .

## 2018-07-27 NOTE — TELEPHONE ENCOUNTER
Left message for patient to return call to clinic. See note below when call is returned and assist in scheduling.     Bridget Garcia MA

## 2018-08-03 NOTE — TELEPHONE ENCOUNTER
LM asking patient to return our call.  Please inform her of the message below and assist with scheduling.  Monica Swanson CMA

## 2018-08-16 ENCOUNTER — MYC REFILL (OUTPATIENT)
Dept: FAMILY MEDICINE | Facility: OTHER | Age: 48
End: 2018-08-16

## 2018-08-16 DIAGNOSIS — N95.1 MENOPAUSAL SYNDROME (HOT FLASHES): ICD-10-CM

## 2018-08-16 RX ORDER — CITALOPRAM HYDROBROMIDE 20 MG/1
20 TABLET ORAL DAILY
Qty: 30 TABLET | Refills: 1 | Status: CANCELLED | OUTPATIENT
Start: 2018-08-16

## 2018-08-16 NOTE — TELEPHONE ENCOUNTER
Message from MyChart:  Original authorizing provider: MD Carmella Celestin Marcin would like a refill of the following medications:  citalopram (CELEXA) 20 MG tablet [Karon Agee MD]    Preferred pharmacy: [facility] - [place], [place] - [address] [place] [address]    Comment:

## 2018-08-17 DIAGNOSIS — N95.1 MENOPAUSAL SYNDROME (HOT FLASHES): ICD-10-CM

## 2018-08-17 RX ORDER — CITALOPRAM HYDROBROMIDE 20 MG/1
20 TABLET ORAL DAILY
Qty: 30 TABLET | Refills: 1 | Status: CANCELLED | OUTPATIENT
Start: 2018-08-17

## 2018-08-20 NOTE — TELEPHONE ENCOUNTER
Celexa:  Pended in tomorrow's OV.     Next 5 appointments (look out 90 days)     Aug 21, 2018  7:40 AM CDT   Office Visit with GISSELL [facility] ([facility])    [address] [facility] [address] [phone]                Ale Armijo, RN, BSN

## 2018-08-21 ENCOUNTER — TELEPHONE (OUTPATIENT)
Dept: FAMILY MEDICINE | Facility: OTHER | Age: 48
End: 2018-08-21

## 2018-08-21 DIAGNOSIS — N95.1 MENOPAUSAL SYNDROME (HOT FLASHES): ICD-10-CM

## 2018-08-21 NOTE — LETTER
[facility]  [address] [place] [address]  [phone]          August 28, 2018    Carmella Burch                                                                                                                     [address] [place] [address]          Dear Carmella,    We have made multiple attempts to contact you and have been unsuccessful.    We received a request to refill Celexa. Unfortunately, we are unable to do so as you are due for a follow-up office visit.    Taking care of your health is important to us, and ongoing visits with your provider are vital to your care.  We look forward to seeing you in the near future.  You may call our office at [phone] to schedule a visit.     Please disregard this notice if you have already made an appointment.    Sincerely,       [facility] Team

## 2018-08-22 NOTE — TELEPHONE ENCOUNTER
Celexa:  Routing refill request to provider for review/approval because:  Patient needs to be seen because:  Mood follow up  Cancelled recent appointment.     Ale Armijo, RN, BSN

## 2018-08-24 RX ORDER — CITALOPRAM HYDROBROMIDE 20 MG/1
TABLET ORAL
Qty: 30 TABLET | Refills: 1 | OUTPATIENT
Start: 2018-08-24

## 2018-08-24 NOTE — TELEPHONE ENCOUNTER
Please call patient to schedule office visit. I have not seen her in over a year.  Thanks,  Tonja Bravo, CNP

## 2018-08-27 NOTE — TELEPHONE ENCOUNTER
LMTC, please see message below and assist with scheduling   Please call patient to schedule office visit. I have not seen her in over a year.  Thanks,  Tonja Bravo, CNP

## 2019-11-05 ENCOUNTER — HEALTH MAINTENANCE LETTER (OUTPATIENT)
Age: 49
End: 2019-11-05

## 2020-02-16 ENCOUNTER — HEALTH MAINTENANCE LETTER (OUTPATIENT)
Age: 50
End: 2020-02-16

## 2020-11-22 ENCOUNTER — HEALTH MAINTENANCE LETTER (OUTPATIENT)
Age: 50
End: 2020-11-22

## 2021-04-10 ENCOUNTER — HEALTH MAINTENANCE LETTER (OUTPATIENT)
Age: 51
End: 2021-04-10

## 2021-09-19 ENCOUNTER — HEALTH MAINTENANCE LETTER (OUTPATIENT)
Age: 51
End: 2021-09-19

## 2022-01-09 ENCOUNTER — HEALTH MAINTENANCE LETTER (OUTPATIENT)
Age: 52
End: 2022-01-09

## 2022-05-01 ENCOUNTER — HEALTH MAINTENANCE LETTER (OUTPATIENT)
Age: 52
End: 2022-05-01

## 2022-11-21 ENCOUNTER — HEALTH MAINTENANCE LETTER (OUTPATIENT)
Age: 52
End: 2022-11-21

## 2023-04-16 ENCOUNTER — HEALTH MAINTENANCE LETTER (OUTPATIENT)
Age: 53
End: 2023-04-16

## 2023-06-02 ENCOUNTER — HEALTH MAINTENANCE LETTER (OUTPATIENT)
Age: 53
End: 2023-06-02